# Patient Record
Sex: FEMALE | Race: WHITE | NOT HISPANIC OR LATINO | URBAN - METROPOLITAN AREA
[De-identification: names, ages, dates, MRNs, and addresses within clinical notes are randomized per-mention and may not be internally consistent; named-entity substitution may affect disease eponyms.]

---

## 2019-09-13 ENCOUNTER — OFFICE (OUTPATIENT)
Dept: URBAN - METROPOLITAN AREA CLINIC 78 | Facility: CLINIC | Age: 44
End: 2019-09-13

## 2019-09-13 VITALS
DIASTOLIC BLOOD PRESSURE: 88 MMHG | SYSTOLIC BLOOD PRESSURE: 119 MMHG | WEIGHT: 244 LBS | HEART RATE: 71 BPM | TEMPERATURE: 96 F | HEIGHT: 66 IN

## 2019-09-13 DIAGNOSIS — R10.11 RIGHT UPPER QUADRANT PAIN: ICD-10-CM

## 2019-09-13 DIAGNOSIS — K62.5 HEMORRHAGE OF ANUS AND RECTUM: ICD-10-CM

## 2019-09-13 DIAGNOSIS — K76.0 FATTY (CHANGE OF) LIVER, NOT ELSEWHERE CLASSIFIED: ICD-10-CM

## 2019-09-13 DIAGNOSIS — R63.4 ABNORMAL WEIGHT LOSS: ICD-10-CM

## 2019-09-13 DIAGNOSIS — R12 HEARTBURN: ICD-10-CM

## 2019-09-13 DIAGNOSIS — R19.7 DIARRHEA, UNSPECIFIED: ICD-10-CM

## 2019-09-13 PROCEDURE — 99244 OFF/OP CNSLTJ NEW/EST MOD 40: CPT

## 2019-09-13 NOTE — SERVICEHPINOTES
YOUNG KIMBLE   is a   44   year old [place]   female who is being seen in consultation at the request of   MARI LANDAVERDE   for RUQ pain for the past month. She states that the pain is burning in nature and is pretty constant but is worse after eating (doesn't seem to matter what kind of food). She notes h/o fatty liver and high triglycerides. She can have a bulge in the area in the morning. She reports chronic daily heartburn. No current N/V. She has tried Zantac which is helpful but she doesn't want to take it regularly. She is most worried about her liver and would like some tests for that. She has chronic tendency for loose stools which can change with food intake. Notes h/o hemorrhoids which can bleed at times. Denies blood mixed in stools. Notes a h/o pre-diabetes which she has tried to improve with lower carb diet. She also notes about a 20-lb weight loss over the past few months - unintentionally.

## 2019-09-23 ENCOUNTER — OFFICE (OUTPATIENT)
Dept: URBAN - METROPOLITAN AREA CLINIC 101 | Facility: CLINIC | Age: 44
End: 2019-09-23

## 2019-09-23 DIAGNOSIS — K76.0 FATTY (CHANGE OF) LIVER, NOT ELSEWHERE CLASSIFIED: ICD-10-CM

## 2019-09-23 DIAGNOSIS — R10.11 RIGHT UPPER QUADRANT PAIN: ICD-10-CM

## 2019-09-23 PROCEDURE — 91200 LIVER ELASTOGRAPHY: CPT

## 2019-10-02 ENCOUNTER — OFFICE (OUTPATIENT)
Dept: URBAN - METROPOLITAN AREA CLINIC 32 | Facility: CLINIC | Age: 44
End: 2019-10-02

## 2019-10-02 VITALS
RESPIRATION RATE: 16 BRPM | WEIGHT: 244 LBS | HEART RATE: 79 BPM | TEMPERATURE: 97.9 F | OXYGEN SATURATION: 95 % | SYSTOLIC BLOOD PRESSURE: 130 MMHG | HEART RATE: 72 BPM | OXYGEN SATURATION: 96 % | RESPIRATION RATE: 18 BRPM | DIASTOLIC BLOOD PRESSURE: 96 MMHG | DIASTOLIC BLOOD PRESSURE: 84 MMHG | HEIGHT: 66 IN | SYSTOLIC BLOOD PRESSURE: 139 MMHG

## 2019-10-02 DIAGNOSIS — R10.11 RIGHT UPPER QUADRANT PAIN: ICD-10-CM

## 2019-10-02 DIAGNOSIS — R12 HEARTBURN: ICD-10-CM

## 2019-10-02 PROCEDURE — 43235 EGD DIAGNOSTIC BRUSH WASH: CPT | Mod: 53

## 2019-10-02 NOTE — SERVICEHPINOTES
Pt presents for EGD due to RUQ pain and heartburn. She requests procedure done without anesthesia.

## 2019-10-21 ENCOUNTER — OFFICE (OUTPATIENT)
Dept: URBAN - METROPOLITAN AREA CLINIC 32 | Facility: CLINIC | Age: 44
End: 2019-10-21

## 2019-10-21 ENCOUNTER — OFFICE (OUTPATIENT)
Dept: URBAN - METROPOLITAN AREA PATHOLOGY 17 | Facility: PATHOLOGY | Age: 44
End: 2019-10-21

## 2019-10-21 VITALS
SYSTOLIC BLOOD PRESSURE: 159 MMHG | SYSTOLIC BLOOD PRESSURE: 136 MMHG | RESPIRATION RATE: 16 BRPM | TEMPERATURE: 97.7 F | OXYGEN SATURATION: 89 % | OXYGEN SATURATION: 100 % | HEART RATE: 68 BPM | DIASTOLIC BLOOD PRESSURE: 97 MMHG | SYSTOLIC BLOOD PRESSURE: 140 MMHG | OXYGEN SATURATION: 98 % | DIASTOLIC BLOOD PRESSURE: 92 MMHG | DIASTOLIC BLOOD PRESSURE: 86 MMHG | SYSTOLIC BLOOD PRESSURE: 144 MMHG | TEMPERATURE: 97.5 F | SYSTOLIC BLOOD PRESSURE: 128 MMHG | HEART RATE: 64 BPM | RESPIRATION RATE: 18 BRPM | OXYGEN SATURATION: 95 % | HEART RATE: 83 BPM | HEIGHT: 66 IN | HEART RATE: 87 BPM | HEART RATE: 88 BPM | WEIGHT: 240 LBS | OXYGEN SATURATION: 99 % | DIASTOLIC BLOOD PRESSURE: 95 MMHG | DIASTOLIC BLOOD PRESSURE: 89 MMHG | SYSTOLIC BLOOD PRESSURE: 134 MMHG | HEART RATE: 81 BPM | DIASTOLIC BLOOD PRESSURE: 88 MMHG

## 2019-10-21 DIAGNOSIS — R63.4 ABNORMAL WEIGHT LOSS: ICD-10-CM

## 2019-10-21 DIAGNOSIS — K22.2 ESOPHAGEAL OBSTRUCTION: ICD-10-CM

## 2019-10-21 DIAGNOSIS — R10.11 RIGHT UPPER QUADRANT PAIN: ICD-10-CM

## 2019-10-21 DIAGNOSIS — K29.60 OTHER GASTRITIS WITHOUT BLEEDING: ICD-10-CM

## 2019-10-21 DIAGNOSIS — R12 HEARTBURN: ICD-10-CM

## 2019-10-21 PROBLEM — R11.0 NAUSEA: Status: ACTIVE | Noted: 2019-10-21

## 2019-10-21 PROBLEM — K44.9 DIAPHRAGMATIC HERNIA WITHOUT OBSTRUCTION OR GANGRENE: Status: ACTIVE | Noted: 2019-10-21

## 2019-10-21 PROBLEM — R10.13 EPIGASTRIC PAIN: Status: ACTIVE | Noted: 2019-10-21

## 2019-10-21 PROCEDURE — 00013: CPT

## 2019-10-21 PROCEDURE — 43239 EGD BIOPSY SINGLE/MULTIPLE: CPT

## 2019-10-21 PROCEDURE — 88305 TISSUE EXAM BY PATHOLOGIST: CPT

## 2019-10-21 RX ORDER — OMEPRAZOLE MAGNESIUM 20.6 MG/1
TABLET, DELAYED RELEASE ORAL
Qty: 30 | Refills: 5 | Status: COMPLETED
Start: 2019-10-21 | End: 2019-10-24

## 2019-10-24 ENCOUNTER — OFFICE (OUTPATIENT)
Dept: URBAN - METROPOLITAN AREA CLINIC 78 | Facility: CLINIC | Age: 44
End: 2019-10-24

## 2019-10-24 VITALS
SYSTOLIC BLOOD PRESSURE: 138 MMHG | HEART RATE: 72 BPM | HEIGHT: 66 IN | WEIGHT: 236 LBS | DIASTOLIC BLOOD PRESSURE: 101 MMHG | TEMPERATURE: 97.4 F

## 2019-10-24 DIAGNOSIS — R12 HEARTBURN: ICD-10-CM

## 2019-10-24 DIAGNOSIS — K76.0 FATTY (CHANGE OF) LIVER, NOT ELSEWHERE CLASSIFIED: ICD-10-CM

## 2019-10-24 DIAGNOSIS — R10.11 RIGHT UPPER QUADRANT PAIN: ICD-10-CM

## 2019-10-24 DIAGNOSIS — K62.5 HEMORRHAGE OF ANUS AND RECTUM: ICD-10-CM

## 2019-10-24 DIAGNOSIS — R19.7 DIARRHEA, UNSPECIFIED: ICD-10-CM

## 2019-10-24 DIAGNOSIS — R63.4 ABNORMAL WEIGHT LOSS: ICD-10-CM

## 2019-10-24 PROCEDURE — 00031: CPT

## 2019-10-24 PROCEDURE — 99214 OFFICE O/P EST MOD 30 MIN: CPT

## 2019-10-24 RX ORDER — OMEPRAZOLE 40 MG/1
CAPSULE, DELAYED RELEASE ORAL
Qty: 30 | Refills: 3 | Status: ACTIVE
Start: 2019-10-24

## 2019-10-24 NOTE — SERVICEHPINOTES
44 year old [place] female presents for f/u RUQ pain. Her recent EGD showed an esophageal ring with some ulcerations. Biopsies are not back yet. She has not yet started on PPI. Her RUQ U/S is scheduled for next week. She likes both FDgard and IBgard pills in terms of heartburn and regularity but states they haven't helped her RUQ pain. Her labs were ok and liver enzymes normal. Fibroscan showed severe steatosis but no evidence for fibrosis (S3, F0). She continues to have loose stools and still needs to schedule her colonoscopy. No new concerns today.Clinical hx: She states her RUQ pain is burning in nature and is pretty constant but is worse after eating (doesn't seem to matter what kind of food). She notes h/o fatty liver and high triglycerides. She can have a bulge in the area in the morning. She reports chronic daily heartburn. No current N/V. She has tried Zantac which is helpful but she doesn't want to take it regularly. She is most worried about her liver. She has chronic tendency for loose stools which can change with food intake. Notes h/o hemorrhoids which can bleed at times. Denies blood mixed in stools. Notes a h/o pre-diabetes which she has tried to improve with lower carb diet. She also notes about a 20-lb weight loss over the past few months (though has had dietary changes).Patient is self pay.9/13/19 Hep C ab neg, insulin 9.2, Hgb 43.9, , plt 372, glucose 103, AST/ALT 11/14, HgbA1C 5.6, HBsAg neg, , , HDL 49 see notes

## 2019-10-30 ENCOUNTER — ON CAMPUS - OUTPATIENT (OUTPATIENT)
Dept: URBAN - METROPOLITAN AREA HOSPITAL 35 | Facility: HOSPITAL | Age: 44
End: 2019-10-30

## 2019-10-30 DIAGNOSIS — R19.4 CHANGE IN BOWEL HABIT: ICD-10-CM

## 2019-10-30 DIAGNOSIS — K58.9 IRRITABLE BOWEL SYNDROME WITHOUT DIARRHEA: ICD-10-CM

## 2019-10-30 PROCEDURE — 45380 COLONOSCOPY AND BIOPSY: CPT

## 2019-11-12 ENCOUNTER — OFFICE (OUTPATIENT)
Dept: URBAN - METROPOLITAN AREA CLINIC 78 | Facility: CLINIC | Age: 44
End: 2019-11-12

## 2019-11-12 VITALS
DIASTOLIC BLOOD PRESSURE: 110 MMHG | HEIGHT: 66 IN | SYSTOLIC BLOOD PRESSURE: 142 MMHG | HEART RATE: 70 BPM | WEIGHT: 247 LBS | TEMPERATURE: 97.5 F

## 2019-11-12 DIAGNOSIS — K76.0 FATTY (CHANGE OF) LIVER, NOT ELSEWHERE CLASSIFIED: ICD-10-CM

## 2019-11-12 DIAGNOSIS — R10.11 RIGHT UPPER QUADRANT PAIN: ICD-10-CM

## 2019-11-12 DIAGNOSIS — K76.89 OTHER SPECIFIED DISEASES OF LIVER: ICD-10-CM

## 2019-11-12 PROCEDURE — 99214 OFFICE O/P EST MOD 30 MIN: CPT

## 2019-11-12 NOTE — SERVICEHPINOTES
44 year old [place] female presents for f/u RUQ pain. Her recent EGD showed an esophageal ring with some ulcerations. Biopsies showed mild gastritis, no Green's, and duodenal biopsies showed intact villous architecture but increased lymphocytosis celiac panel was advised. She has been on PPI daily with minimal change in her symptoms. Her RUQ U/S was negative for gallstones but did show a 1.6 cm liver lesion - likely a hemangioma. A f/u U/S was advised for 6 months, but patient wants to proceed with an MRI now. She is also wanting some nutrient level testing. She likes both FDgard and IBgard pills in terms of heartburn and regularity but states they haven't helped her RUQ pain. Her labs were ok and liver enzymes normal. Fibroscan showed severe steatosis but no evidence for fibrosis (S3, F0). Recent colonoscopy done due to h/o loose stools and occasional blood per rectum this was unremarkable other than hemorrhoids. Random biopsies negative. Her weight is stable. Stress levels are very high.Clinical hx: She states her RUQ pain is burning in nature and is pretty constant but is worse after eating (doesn't seem to matter what kind of food). She notes h/o fatty liver and high triglycerides. She can have a bulge in the area in the morning. She reports chronic daily heartburn. No current N/V. She has tried Zantac which is helpful but she doesn't want to take it regularly. She is most worried about her liver. She has chronic tendency for loose stools which can change with food intake. Notes h/o hemorrhoids which can bleed at times. Denies blood mixed in stools. Notes a h/o pre-diabetes which she has tried to improve with lower carb diet. She also notes about a 20-lb weight loss over the past few months (though has had dietary changes).Patient is self pay.9/13/19 Hep C ab neg, insulin 9.2, Hgb 43.9, , plt 372, glucose 103, AST/ALT 11/14, HgbA1C 5.6, HBsAg neg, , , HDL 49 see notes